# Patient Record
Sex: MALE | Race: OTHER | Employment: OTHER | URBAN - METROPOLITAN AREA
[De-identification: names, ages, dates, MRNs, and addresses within clinical notes are randomized per-mention and may not be internally consistent; named-entity substitution may affect disease eponyms.]

---

## 2017-02-13 NOTE — PATIENT DISCUSSION
DRY EYE SYNDROME OU:  OPTION OF PUNCTAL PLUGS VS ARTIFICIAL TEARS VS RESTASIS DISCUSSED. RBA'S FOR PUNCTAL PLUGS INCLUDE POSSIBLE IRRITATIONS, OVER FLOW TEARING AND NEED FOR REMOVAL. PATIENT UNDERSTANDS AND DESIRES TO HAVE PUNCTAL PLUGS INSERTED INTO RIGHT LOWER PUNCTUM AND LEFT LOWER PUNCTUM. DR LIGHT INSERTED PLUGS INTO BOTH LOWER PUNCTUM WITHOUT DIFFICULTY TODAY.

## 2017-02-13 NOTE — PATIENT DISCUSSION
POSTERIOR VITREOUS DETACHMENT, OS:  NO HOLES. NO TEARS. RETINAL  DETACHMENT WARNINGS DISCUSSED. FLOATERS AND FLASHES BROCHURE GIVEN. RETURN FOR FOLLOW-UP AS SCHEDULED.

## 2017-12-21 NOTE — PATIENT DISCUSSION
EPIRETINAL MEMBRANE OD: ERM NOT VISUALLY SIGNIFICANT TO THE PATIENT AT THIS TIME. WILL CONTINUE TO FOLLOW WITH EXAMS AND OCT'S AS SCHEDULED.

## 2017-12-21 NOTE — PATIENT DISCUSSION
SEVERE DRY EYES: INCREASE FREQUENCY OF DISAPPEARING PRESERVATIVE OR PRESERVATIVE FREE ARTIFICIAL TEAR USE TO 4 - 6 TIMES PER DAY. START RESTASIS BID, OU. CONTINUE OMEGA-3 FATTY ACID AND ADD WARM COMPRESSES AND EYELID SCRUBS DAILY. WILL RECOMMEND CONSIDER PUNCTAL PLUGS AND/OR LIPIFLOW TREATMENT IF NOT RESPONSIVE OR IF SYMPTOMS PERSIST. RETURN FOR FOLLOW UP AS SCHEDULED OR SOONER IF SYMPTOMS WORSEN.

## 2018-02-20 NOTE — PATIENT DISCUSSION
GLAUCOMA SUSPECT, OU : INTRAOCULAR PRESSURE IS ELEVATED AND OCT SHOWS THINNING IN OS, SCHEDULE  HVF/PHACH / PHOTO, RETURN FOR FURTHER TESTING.

## 2018-06-15 NOTE — PATIENT DISCUSSION
GLAUCOMA SUSPECT, OU : INTRAOCULAR PRESSURE AND HVF IS WITHIN ACCEPTABLE LIMITS. NONSPECIFIC CHANGES, WATCH OS CLOSELY FOR CHANGE. NO DROP THERAPY NEEDED AT THIS TIME.

## 2018-08-20 NOTE — PATIENT DISCUSSION
GLAUCOMA SUSPECT, OU : INTRAOCULAR PRESSURE IS WITHIN ACCEPTABLE LIMITS. SLIGHTLY THICK PACHS,  NO DROP THERAPY NEEDED AT THIS TIME.

## 2019-02-18 NOTE — PATIENT DISCUSSION
AMD (DRY), OU:  PRESCRIBE AREDS 2 VITAMINS AND UV PROTECTION TO SLOW PROGRESSION. SMOKING CESSATION EMPHASIZED. PRESCRIBE REGULAR AMSLER GRID CHECKS TO SELF MONITOR. PATIENT TO CALL WITH CHANGES. FOLLOW WITH DR Jeffrey Soto AS SCHEDULED.

## 2020-07-20 ENCOUNTER — CATARACT EVALUATION (OUTPATIENT)
Dept: URBAN - METROPOLITAN AREA CLINIC 39 | Facility: CLINIC | Age: 70
End: 2020-07-20

## 2020-07-20 DIAGNOSIS — H25.812: ICD-10-CM

## 2020-07-20 DIAGNOSIS — H04.123: ICD-10-CM

## 2020-07-20 DIAGNOSIS — H35.30: ICD-10-CM

## 2020-07-20 DIAGNOSIS — H25.811: ICD-10-CM

## 2020-07-20 PROCEDURE — V2799PMN IMPRIMIS PRED-MOXI-NEPAF 5ML

## 2020-07-20 PROCEDURE — 92134 CPTRZ OPH DX IMG PST SGM RTA: CPT

## 2020-07-20 PROCEDURE — 92025-2 CORNEAL TOPOGRAPHY, PT

## 2020-07-20 PROCEDURE — 92136TC50 INTERFEROMETRY - TECHNICAL COMPONENT

## 2020-07-20 PROCEDURE — 92015 DETERMINE REFRACTIVE STATE: CPT

## 2020-07-20 PROCEDURE — 99204 OFFICE O/P NEW MOD 45 MIN: CPT

## 2020-07-20 ASSESSMENT — VISUAL ACUITY
OD_BAT: 20/70
OD_PH: 20/30
OD_CC: J1
OD_RAM: 20/20-1
OS_SC: J12
OS_SC: 20/60+1
OS_BAT: 20/50
OS_AM: 20/20-2
OS_CC: J1-
OD_SC: 20/60-1
OD_SC: J12
OS_PH: 20/25+1

## 2020-07-20 ASSESSMENT — TONOMETRY
OD_IOP_MMHG: 18
OS_IOP_MMHG: 16

## 2020-07-28 ENCOUNTER — SURGERY/PROCEDURE (OUTPATIENT)
Dept: URBAN - METROPOLITAN AREA CLINIC 39 | Facility: CLINIC | Age: 70
End: 2020-07-28

## 2020-07-28 ENCOUNTER — PRE-OP/H&P (OUTPATIENT)
Dept: URBAN - METROPOLITAN AREA CLINIC 39 | Facility: CLINIC | Age: 70
End: 2020-07-28

## 2020-07-28 DIAGNOSIS — H25.811: ICD-10-CM

## 2020-07-28 PROCEDURE — 65772LRI LRI DURING CAT SX

## 2020-07-28 PROCEDURE — 66999LNSR LENSAR LASER FOR CAT SX

## 2020-07-28 PROCEDURE — 66984AV REMOVE CATARACT, INSERT ADVANCED LENS

## 2020-07-28 PROCEDURE — 99211T TECH SERVICE

## 2020-07-29 ENCOUNTER — CATARACT POST-OP 1-DAY (OUTPATIENT)
Dept: URBAN - METROPOLITAN AREA CLINIC 35 | Facility: CLINIC | Age: 70
End: 2020-07-29

## 2020-07-29 DIAGNOSIS — H25.812: ICD-10-CM

## 2020-07-29 DIAGNOSIS — Z96.1: ICD-10-CM

## 2020-07-29 PROCEDURE — 99212 OFFICE O/P EST SF 10 MIN: CPT

## 2020-07-29 PROCEDURE — 99024 POSTOP FOLLOW-UP VISIT: CPT

## 2020-07-29 ASSESSMENT — VISUAL ACUITY
OS_SC: J10
OD_SC: J8
OD_SC: 20/25
OS_SC: 20/50
OU_SC: 20/25-2

## 2020-07-29 ASSESSMENT — TONOMETRY
OD_IOP_MMHG: 16
OS_IOP_MMHG: 19

## 2020-08-04 ENCOUNTER — PRE-OP/H&P (OUTPATIENT)
Dept: URBAN - METROPOLITAN AREA CLINIC 39 | Facility: CLINIC | Age: 70
End: 2020-08-04

## 2020-08-04 ENCOUNTER — SURGERY/PROCEDURE (OUTPATIENT)
Dept: URBAN - METROPOLITAN AREA CLINIC 39 | Facility: CLINIC | Age: 70
End: 2020-08-04

## 2020-08-04 DIAGNOSIS — H25.812: ICD-10-CM

## 2020-08-04 PROCEDURE — 65772LRI LRI DURING CAT SX

## 2020-08-04 PROCEDURE — 66999LNSR LENSAR LASER FOR CAT SX

## 2020-08-04 PROCEDURE — 66984AV REMOVE CATARACT, INSERT ADVANCED LENS

## 2020-08-04 PROCEDURE — 99211T TECH SERVICE

## 2020-08-04 ASSESSMENT — VISUAL ACUITY
OD_SC: J5
OD_SC: 20/30
OS_SC: 20/50
OS_SC: J10

## 2020-08-04 ASSESSMENT — TONOMETRY
OD_IOP_MMHG: 14
OS_IOP_MMHG: 15

## 2020-08-05 ENCOUNTER — 1 DAY POST-OP (OUTPATIENT)
Dept: URBAN - METROPOLITAN AREA CLINIC 35 | Facility: CLINIC | Age: 70
End: 2020-08-05

## 2020-08-05 DIAGNOSIS — Z96.1: ICD-10-CM

## 2020-08-05 PROCEDURE — P6698455 NON-COMANAGED ADVANCED PO

## 2020-08-05 ASSESSMENT — TONOMETRY
OS_IOP_MMHG: 15
OD_IOP_MMHG: 16

## 2020-08-05 ASSESSMENT — VISUAL ACUITY
OD_SC: 20/40-2
OU_SC: 20/30-1
OS_SC: 20/40-1
OU_SC: J2

## 2020-08-19 ENCOUNTER — POST-OP CATARACT (OUTPATIENT)
Dept: URBAN - METROPOLITAN AREA CLINIC 35 | Facility: CLINIC | Age: 70
End: 2020-08-19

## 2020-08-19 DIAGNOSIS — Z96.1: ICD-10-CM

## 2020-08-19 PROCEDURE — 99024 POSTOP FOLLOW-UP VISIT: CPT

## 2020-08-19 ASSESSMENT — KERATOMETRY
OD_K2POWER_DIOPTERS: 44.25
OD_K1POWER_DIOPTERS: 43.75
OS_K1POWER_DIOPTERS: 44
OS_K2POWER_DIOPTERS: 43.25

## 2020-08-19 ASSESSMENT — VISUAL ACUITY
OD_PH: 20/25
OD_SC: 20/30-2
OS_SC: J1
OD_SC: J2
OU_SC: J1
OS_PH: 20/25-2
OS_SC: 20/40
OU_SC: 20/30

## 2020-08-19 ASSESSMENT — TONOMETRY
OS_IOP_MMHG: 16
OD_IOP_MMHG: 15

## 2020-11-19 ENCOUNTER — ESTABLISHED COMPREHENSIVE EXAM (OUTPATIENT)
Dept: URBAN - METROPOLITAN AREA CLINIC 35 | Facility: CLINIC | Age: 70
End: 2020-11-19

## 2020-11-19 DIAGNOSIS — H04.123: ICD-10-CM

## 2020-11-19 DIAGNOSIS — H43.811: ICD-10-CM

## 2020-11-19 DIAGNOSIS — H26.493: ICD-10-CM

## 2020-11-19 DIAGNOSIS — H35.30: ICD-10-CM

## 2020-11-19 PROCEDURE — 92014 COMPRE OPH EXAM EST PT 1/>: CPT

## 2020-11-19 PROCEDURE — 92134 CPTRZ OPH DX IMG PST SGM RTA: CPT

## 2020-11-19 ASSESSMENT — VISUAL ACUITY
OD_SC: J4
OS_SC: 20/30-1
OS_SC: J8
OD_SC: 20/50-2

## 2020-11-19 ASSESSMENT — TONOMETRY
OD_IOP_MMHG: 18
OS_IOP_MMHG: 12

## 2021-01-21 ENCOUNTER — SURGERY/PROCEDURE (OUTPATIENT)
Dept: URBAN - METROPOLITAN AREA SURGERY 14 | Facility: SURGERY | Age: 71
End: 2021-01-21

## 2021-01-21 ENCOUNTER — YAG EVALUATION (OUTPATIENT)
Dept: URBAN - METROPOLITAN AREA SURGERY 14 | Facility: SURGERY | Age: 71
End: 2021-01-21

## 2021-01-21 DIAGNOSIS — Z96.1: ICD-10-CM

## 2021-01-21 DIAGNOSIS — H04.123: ICD-10-CM

## 2021-01-21 DIAGNOSIS — H35.30: ICD-10-CM

## 2021-01-21 DIAGNOSIS — H26.493: ICD-10-CM

## 2021-01-21 DIAGNOSIS — H43.811: ICD-10-CM

## 2021-01-21 PROCEDURE — 66821 AFTER CATARACT LASER SURGERY: CPT

## 2021-01-21 PROCEDURE — 92014 COMPRE OPH EXAM EST PT 1/>: CPT

## 2021-01-21 ASSESSMENT — VISUAL ACUITY
OD_SC: 20/40-1
OD_BAT: 20/50
OS_SC: 20/40
OS_BAT: 20/60

## 2021-01-21 ASSESSMENT — TONOMETRY
OD_IOP_MMHG: 11
OS_IOP_MMHG: 11

## 2021-02-02 ENCOUNTER — YAG POST-OP (OUTPATIENT)
Dept: URBAN - METROPOLITAN AREA CLINIC 35 | Facility: CLINIC | Age: 71
End: 2021-02-02

## 2021-02-02 DIAGNOSIS — Z98.890: ICD-10-CM

## 2021-02-02 PROCEDURE — 99024 POSTOP FOLLOW-UP VISIT: CPT

## 2021-02-02 ASSESSMENT — TONOMETRY
OS_IOP_MMHG: 17
OD_IOP_MMHG: 17

## 2021-02-02 ASSESSMENT — VISUAL ACUITY
OD_SC: J3-
OS_SC: 20/25
OS_SC: J3
OD_SC: 20/40

## 2021-11-09 ENCOUNTER — OCT ONLY (OUTPATIENT)
Dept: URBAN - METROPOLITAN AREA CLINIC 35 | Facility: CLINIC | Age: 71
End: 2021-11-09

## 2021-11-09 DIAGNOSIS — H04.123: ICD-10-CM

## 2021-11-09 DIAGNOSIS — H43.811: ICD-10-CM

## 2021-11-09 PROCEDURE — 92134 CPTRZ OPH DX IMG PST SGM RTA: CPT

## 2021-11-09 PROCEDURE — 92012 INTRM OPH EXAM EST PATIENT: CPT

## 2021-11-09 ASSESSMENT — VISUAL ACUITY
OS_SC: 20/25-2
OU_SC: J2
OD_SC: J2
OD_SC: 20/30+1
OU_SC: 20/25-
OS_SC: J1-

## 2021-11-09 ASSESSMENT — TONOMETRY
OD_IOP_MMHG: 15
OS_IOP_MMHG: 15